# Patient Record
Sex: FEMALE | Race: OTHER
[De-identification: names, ages, dates, MRNs, and addresses within clinical notes are randomized per-mention and may not be internally consistent; named-entity substitution may affect disease eponyms.]

---
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
## 2020-02-04 ENCOUNTER — HOSPITAL ENCOUNTER (INPATIENT)
Dept: HOSPITAL 74 - JER | Age: 20
LOS: 2 days | Discharge: HOME | DRG: 249 | End: 2020-02-06
Attending: NURSE PRACTITIONER | Admitting: INTERNAL MEDICINE
Payer: COMMERCIAL

## 2020-02-04 VITALS — BODY MASS INDEX: 21.6 KG/M2

## 2020-02-04 DIAGNOSIS — N80.9: ICD-10-CM

## 2020-02-04 DIAGNOSIS — K52.9: Primary | ICD-10-CM

## 2020-02-04 DIAGNOSIS — R11.10: ICD-10-CM

## 2020-02-04 DIAGNOSIS — N83.202: ICD-10-CM

## 2020-02-04 DIAGNOSIS — F12.90: ICD-10-CM

## 2020-02-04 DIAGNOSIS — G43.909: ICD-10-CM

## 2020-02-04 DIAGNOSIS — R10.9: ICD-10-CM

## 2020-02-04 DIAGNOSIS — Z29.9: ICD-10-CM

## 2020-02-04 LAB
ALBUMIN SERPL-MCNC: 4.1 G/DL (ref 3.4–5)
ALP SERPL-CCNC: 72 U/L (ref 45–117)
ALT SERPL-CCNC: 22 U/L (ref 13–61)
ANION GAP SERPL CALC-SCNC: 8 MMOL/L (ref 8–16)
APPEARANCE UR: CLEAR
APTT BLD: 24.5 SECONDS (ref 25.2–36.5)
AST SERPL-CCNC: 18 U/L (ref 15–37)
BACTERIA # UR AUTO: 11.2 /HPF
BASOPHILS # BLD: 0.2 % (ref 0–2)
BILIRUB SERPL-MCNC: 0.6 MG/DL (ref 0.2–1)
BILIRUB UR STRIP.AUTO-MCNC: NEGATIVE MG/DL
BUN SERPL-MCNC: 8.2 MG/DL (ref 7–18)
CALCIUM SERPL-MCNC: 9.9 MG/DL (ref 8.5–10.1)
CASTS URNS QL MICRO: 10 /LPF (ref 0–8)
CHLORIDE SERPL-SCNC: 108 MMOL/L (ref 98–107)
CO2 SERPL-SCNC: 23 MMOL/L (ref 21–32)
COLOR UR: YELLOW
CREAT SERPL-MCNC: 0.8 MG/DL (ref 0.55–1.3)
DEPRECATED RDW RBC AUTO: 12.9 % (ref 11.6–15.6)
EOSINOPHIL # BLD: 0.1 % (ref 0–4.5)
EPITH CASTS URNS QL MICRO: 5.5 /HPF
GLUCOSE SERPL-MCNC: 139 MG/DL (ref 74–106)
HCT VFR BLD CALC: 37.3 % (ref 32.4–45.2)
HGB BLD-MCNC: 12.6 GM/DL (ref 10.7–15.3)
INR BLD: 1.13 (ref 0.83–1.09)
KETONES UR QL STRIP: (no result)
LEUKOCYTE ESTERASE UR QL STRIP.AUTO: NEGATIVE
LIPASE SERPL-CCNC: 125 U/L (ref 73–393)
LYMPHOCYTES # BLD: 7.4 % (ref 8–40)
MAGNESIUM SERPL-MCNC: 2 MG/DL (ref 1.8–2.4)
MCH RBC QN AUTO: 28.9 PG (ref 25.7–33.7)
MCHC RBC AUTO-ENTMCNC: 33.7 G/DL (ref 32–36)
MCV RBC: 85.7 FL (ref 80–96)
MONOCYTES # BLD AUTO: 2.9 % (ref 3.8–10.2)
NEUTROPHILS # BLD: 89.4 % (ref 42.8–82.8)
NITRITE UR QL STRIP: NEGATIVE
PH UR: >= 9 [PH] (ref 5–8)
PLATELET # BLD AUTO: 331 K/MM3 (ref 134–434)
PMV BLD: 8.8 FL (ref 7.5–11.1)
POTASSIUM SERPLBLD-SCNC: 3.7 MMOL/L (ref 3.5–5.1)
PROT SERPL-MCNC: 8 G/DL (ref 6.4–8.2)
PROT UR QL STRIP: NEGATIVE
PROT UR QL STRIP: NEGATIVE
PT PNL PPP: 13.4 SEC (ref 9.7–13)
RBC # BLD AUTO: 16 /HPF (ref 0–4)
RBC # BLD AUTO: 4.36 M/MM3 (ref 3.6–5.2)
SODIUM SERPL-SCNC: 139 MMOL/L (ref 136–145)
SP GR UR: 1.02 (ref 1.01–1.03)
UROBILINOGEN UR STRIP-MCNC: 0.2 MG/DL (ref 0.2–1)
WBC # BLD AUTO: 14.5 K/MM3 (ref 4–10)
WBC # UR AUTO: 2 /HPF (ref 0–5)

## 2020-02-04 PROCEDURE — G0008 ADMIN INFLUENZA VIRUS VAC: HCPCS

## 2020-02-04 RX ADMIN — ACETAMINOPHEN PRN MG: 10 INJECTION, SOLUTION INTRAVENOUS at 23:43

## 2020-02-04 RX ADMIN — ACETAMINOPHEN ONE MG: 10 INJECTION, SOLUTION INTRAVENOUS at 14:43

## 2020-02-04 RX ADMIN — SODIUM CHLORIDE, POTASSIUM CHLORIDE, SODIUM LACTATE AND CALCIUM CHLORIDE SCH MLS/HR: 600; 310; 30; 20 INJECTION, SOLUTION INTRAVENOUS at 23:41

## 2020-02-04 RX ADMIN — SODIUM CHLORIDE, POTASSIUM CHLORIDE, SODIUM LACTATE AND CALCIUM CHLORIDE SCH MLS/HR: 600; 310; 30; 20 INJECTION, SOLUTION INTRAVENOUS at 18:20

## 2020-02-04 NOTE — PN
Teaching Attending Note


Name of Resident: Emerson Scott





ATTENDING PHYSICIAN STATEMENT





I saw and evaluated the patient.


I reviewed the resident's note and discussed the case with the resident.


I agree with the resident's findings and plan as documented.








SUBJECTIVE: This is a 19 year old woman with a history of migraine headaches, 

Boerhaave syndrome, marijuana use, endometriosis who comes to the ED 

complaining of sudden onset of nausea, retching, vomiting and diarrhea while 

driving to school this morning. She has chronic intermittent epigastric pain 

for which she takes ibuprofen. She denies fevers but has had chills. She says 

she has been vomiting bilious fluid and diarrhea has been watery and non-

bloody. She denies recent travel. She has not eaten anything or anyplace 

unusual except for some beef on 2/2 (she does not normally eat beef).








OBJECTIVE:


 Vital Signs











 Period  Temp  Pulse  Resp  BP Sys/Hutson  Pulse Ox


 


 Last 24 Hr  98.2 F-98.4 F  62-88  20-20  /  








HEART: S1S2, RRR


LUNGS: Clear


ABDOMEN: Soft, non-distended, (+) periumbilical tenderness, normal BS


EXTREMITIES: No edema





 Laboratory Tests











  02/04/20 02/04/20 02/04/20





  13:30 13:30 13:30


 


WBC   14.5 H 


 


RBC   4.36 


 


Hgb   12.6 


 


Hct   37.3 


 


MCV   85.7 


 


MCH   28.9 


 


MCHC   33.7 


 


RDW   12.9 


 


Plt Count   331  D 


 


MPV   8.8 


 


Absolute Neuts (auto)   13.0 H 


 


Neutrophils %   89.4 H D 


 


Lymphocytes %   7.4 L D 


 


Monocytes %   2.9 L 


 


Eosinophils %   0.1  D 


 


Basophils %   0.2 


 


Nucleated RBC %   0 


 


PT with INR   


 


INR   


 


PTT (Actin FS)   


 


Sodium    139


 


Potassium    3.7


 


Chloride    108 H


 


Carbon Dioxide    23


 


Anion Gap    8


 


BUN    8.2


 


Creatinine    0.8


 


Est GFR (CKD-EPI)AfAm    122.14


 


Est GFR (CKD-EPI)NonAf    105.39


 


Random Glucose    139 H


 


Calcium    9.9


 


Magnesium    2.0


 


Total Bilirubin    0.6


 


AST    18


 


ALT    22


 


Alkaline Phosphatase    72


 


Total Protein    8.0


 


Albumin    4.1


 


Lipase    125


 


Serum Pregnancy, Qual  Negative  


 


Urine Color   


 


Urine Appearance   


 


Urine pH   


 


Ur Specific Gravity   


 


Urine Protein   


 


Urine Glucose (UA)   


 


Urine Ketones   


 


Urine Blood   


 


Urine Nitrite   


 


Urine Bilirubin   


 


Urine Urobilinogen   


 


Ur Leukocyte Esterase   


 


Urine WBC (Auto)   


 


Urine RBC (Auto)   


 


Urine Casts (Auto)   


 


U Epithel Cells (Auto)   


 


Urine Bacteria (Auto)   


 


Blood Type   


 


Antibody Screen   














  02/04/20 02/04/20 02/04/20





  13:30 13:30 17:00


 


WBC   


 


RBC   


 


Hgb   


 


Hct   


 


MCV   


 


MCH   


 


MCHC   


 


RDW   


 


Plt Count   


 


MPV   


 


Absolute Neuts (auto)   


 


Neutrophils %   


 


Lymphocytes %   


 


Monocytes %   


 


Eosinophils %   


 


Basophils %   


 


Nucleated RBC %   


 


PT with INR  13.40 H  


 


INR  1.13 H  


 


PTT (Actin FS)  24.5 L  


 


Sodium   


 


Potassium   


 


Chloride   


 


Carbon Dioxide   


 


Anion Gap   


 


BUN   


 


Creatinine   


 


Est GFR (CKD-EPI)AfAm   


 


Est GFR (CKD-EPI)NonAf   


 


Random Glucose   


 


Calcium   


 


Magnesium   


 


Total Bilirubin   


 


AST   


 


ALT   


 


Alkaline Phosphatase   


 


Total Protein   


 


Albumin   


 


Lipase   


 


Serum Pregnancy, Qual   


 


Urine Color    Yellow


 


Urine Appearance    Clear


 


Urine pH    >= 9.0 H


 


Ur Specific Gravity    1.022


 


Urine Protein    Negative


 


Urine Glucose (UA)    Negative


 


Urine Ketones    2+ H


 


Urine Blood    2+ H


 


Urine Nitrite    Negative


 


Urine Bilirubin    Negative


 


Urine Urobilinogen    0.2


 


Ur Leukocyte Esterase    Negative


 


Urine WBC (Auto)    2


 


Urine RBC (Auto)    16


 


Urine Casts (Auto)    10


 


U Epithel Cells (Auto)    5.5


 


Urine Bacteria (Auto)    11.2


 


Blood Type   O POSITIVE 


 


Antibody Screen   Negative 








 Home Medications











 Medication  Instructions  Recorded


 


Amoxicillin/Potassium Clav 1 each PO BID #10 tablet 04/06/17





[Augmentin 500-125 Tablet]  


 


Clindamycin [Cleocin -] 300 mg PO TID #15 capsule 04/06/17














ASSESSMENT AND PLAN:


This is a 19 year old woman with a history of migraine headaches, Boerhaave 

syndrome, marijuana use, endometriosis who presented to the ED with nausea, 

vomiting and diarrhea.





1. Acute colitis with nausea, vomiting, diarrhea


   - Given Cipro, Flagyl in ED


   - Afebrile, leukocytosis may be reactive


   - Will hold antibiotics


   - Supportive care with IV fluid, pain control, antiemetics


   - Avoid marijuana


   - Check stool culture, WBC, O&P


2. Left ovarian cyst


   - Gyn follow-up as outpatient

## 2020-02-04 NOTE — PDOC
History of Present Illness





- General


Chief Complaint: Nausea/Vomiting


Stated Complaint: COLD SYMPSTOM


Time Seen by Provider: 02/04/20 13:27





- History of Present Illness


Initial Comments: 





Ms. Azevedo is a 20 y/o female with PMH of marijuana use, presenting today with 

epigastric abdominal pain that started this morning. Reports associated nausea 

and vomiting x3 NBNB. No chest pain/shortness of breath. Reports associated 

diffuse abdominal pain. Reports mild frontal headache. Denies dysuria. Denies 

diarrhea. Reports having similar episodes of abdominal pain and nausea/vomiting 

before. No vaginal bleeding or discharge. LNMP 1 week ago. 





Past History





- Past Medical History


Allergies/Adverse Reactions: 


 Allergies











Allergy/AdvReac Type Severity Reaction Status Date / Time


 


No Known Allergies Allergy   Verified 04/02/17 19:48











Home Medications: 


Ambulatory Orders





Amoxicillin/Potassium Clav [Augmentin 500-125 Tablet] 1 each PO BID #10 tablet 

04/06/17 


Clindamycin [Cleocin -] 300 mg PO TID #15 capsule 04/06/17 








Anemia: No


Asthma: No


Cancer: No


Cardiac Disorders: No


CVA: No


COPD: No


CHF: No


Dementia: No


Diabetes: No


GI Disorders: No


 Disorders: No


HTN: No


Hypercholesterolemia: No


Liver Disease: No


Seizures: No


Thyroid Disease: No





- Immunization History


Immunization Up to Date: Yes





- Psycho Social/Smoking Cessation Hx


Smoking History: Unknown if ever smoked


Have you smoked in the past 12 months: No


Number of Cigarettes Smoked Daily: 0


Cigars Per Day: 0


Hx Alcohol Use:  (pt refusing to unswer)


Drug/Substance Use Hx: Yes (refusing to unswer)


Substance Use Type: None


Hx Substance Use Treatment: No





**Review of Systems





- Review of Systems


Comments:: 





GENERAL/CONSTITUTIONAL: No fever or chills. No weakness._


HEAD, EYES, EARS, NOSE AND THROAT: No change in vision. No change in hearing. 

No sore throat._


CARDIOVASCULAR: No chest pain or shortness of breath_


RESPIRATORY: Denies cough, hemoptysis_


GASTROINTESTINAL: Reports abdominal pain, nausea, vomiting. 


GENITOURINARY: No dysuria, frequency, or change in urination._


MUSCULOSKELETAL: No joint or muscle swelling or pain. No neck or back pain._


SKIN: No rash_


NEUROLOGIC: No headache, vertigo, loss of consciousness, or change in strength/

sensation._


ENDOCRINE: No increased thirst. No abnormal weight change_


HEMATOLOGIC/LYMPHATIC: No anemia, easy bleeding, or history of blood clots._


ALLERGIC/IMMUNOLOGIC: No hives or skin allergy._








*Physical Exam





- Vital Signs


 Last Vital Signs











Temp Pulse Resp BP Pulse Ox


 


 98.4 F   74   20   111/75   100 


 


 02/04/20 12:55  02/04/20 13:35  02/04/20 13:35  02/04/20 13:35  02/04/20 13:35














- Physical Exam








GENERAL: Awake, alert, and oriented to person/place/time, in no acute distress_


HEAD: No signs of trauma, normoc ephalic, atraumatic _


EYES: PERRLA, EOMI, sclera anicteric, conjunctiva clear_


ENT: Hearing grossly normal, nares patent, oropharynx clear without exudates. 

No uvular deviation. Moist mucosa_


NECK: Normal ROM, supple, no lymphadenopathy, JVD, or masses_


LUNGS: No distress, speaks in full sentences, clear to auscultation bilaterally 

_


HEART: Regular rate and rhythm, normal S1 and S2, no murmurs appreciated, 

peripheral pulses normal and equal bilaterally._


ABDOMEN: Soft, diffuse TTP worse in the epigastric area, normoactive bowel 

sounds. No guarding, no rebound. No masses_


EXTREMITIES: Normal inspection, Normal range of motion, no edema. No clubbing 

or cyanosis_


NEUROLOGICAL: Cranial nerves II through XII grossly intact. Normal speech, 

normal gait, no focal sensorimotor deficits _


SKIN: Warm, Dry, normal turgor, no rashes or lesions noted_








ED Treatment Course





- LABORATORY


CBC & Chemistry Diagram: 


 02/04/20 13:30





 02/04/20 13:30





- Medications


Given in the ED: 


ED Medications














Discontinued Medications














Generic Name Dose Route Start Last Admin





  Trade Name Freq  PRN Reason Stop Dose Admin


 


Ondansetron HCl  4 mg  02/04/20 13:44  02/04/20 13:50





  Zofran Injection  IVPUSH  02/04/20 13:45  4 mg





  ONCE ONE   Administration





     





     





     





     














Medical Decision Making





- Medical Decision Making





02/04/20 14:41


19F presenting with epigastric abdominal pain, nausea, and vomiting x3.


-cbc, cmp, lipase, coags


-ekg, cxr


-ua, ucx, upreg 


-US gallbladder 





02/04/20 15:25


EKG shows NSR, 69 bpm, no ST elevation, QTc 465. 





02/04/20 15:25


Labs reviewed.


 Laboratory Last Values











WBC  14.5 K/mm3 (4.0-10.0)  H  02/04/20  13:30    


 


RBC  4.36 M/mm3 (3.60-5.2)   02/04/20  13:30    


 


Hgb  12.6 GM/dL (10.7-15.3)   02/04/20  13:30    


 


Hct  37.3 % (32.4-45.2)   02/04/20  13:30    


 


MCV  85.7 fl (80-96)   02/04/20  13:30    


 


MCH  28.9 pg (25.7-33.7)   02/04/20  13:30    


 


MCHC  33.7 g/dl (32.0-36.0)   02/04/20  13:30    


 


RDW  12.9 % (11.6-15.6)   02/04/20  13:30    


 


Plt Count  331 K/MM3 (134-434)  D 02/04/20  13:30    


 


MPV  8.8 fl (7.5-11.1)   02/04/20  13:30    


 


Absolute Neuts (auto)  13.0 K/mm3 (1.5-8.0)  H  02/04/20  13:30    


 


Neutrophils %  89.4 % (42.8-82.8)  H D 02/04/20  13:30    


 


Lymphocytes %  7.4 % (8-40)  L D 02/04/20  13:30    


 


Monocytes %  2.9 % (3.8-10.2)  L  02/04/20  13:30    


 


Eosinophils %  0.1 % (0-4.5)  D 02/04/20  13:30    


 


Basophils %  0.2 % (0-2.0)   02/04/20  13:30    


 


Nucleated RBC %  0 % (0-0)   02/04/20  13:30    


 


PT with INR  13.40 SEC (9.7-13.0)  H  02/04/20  13:30    


 


INR  1.13  (0.83-1.09)  H  02/04/20  13:30    


 


PTT (Actin FS)  24.5 SECONDS (25.2-36.5)  L  02/04/20  13:30    


 


Sodium  139 mmol/L (136-145)   02/04/20  13:30    


 


Potassium  3.7 mmol/L (3.5-5.1)   02/04/20  13:30    


 


Chloride  108 mmol/L ()  H  02/04/20  13:30    


 


Carbon Dioxide  23 mmol/L (21-32)   02/04/20  13:30    


 


Anion Gap  8 MMOL/L (8-16)   02/04/20  13:30    


 


BUN  8.2 mg/dL (7-18)   02/04/20  13:30    


 


Creatinine  0.8 mg/dL (0.55-1.3)   02/04/20  13:30    


 


Est GFR (CKD-EPI)AfAm  122.14   02/04/20  13:30    


 


Est GFR (CKD-EPI)NonAf  105.39   02/04/20  13:30    


 


Random Glucose  139 mg/dL ()  H  02/04/20  13:30    


 


Calcium  9.9 mg/dL (8.5-10.1)   02/04/20  13:30    


 


Magnesium  2.0 mg/dL (1.8-2.4)   02/04/20  13:30    


 


Total Bilirubin  0.6 mg/dL (0.2-1)   02/04/20  13:30    


 


AST  18 U/L (15-37)   02/04/20  13:30    


 


ALT  22 U/L (13-61)   02/04/20  13:30    


 


Alkaline Phosphatase  72 U/L ()   02/04/20  13:30    


 


Total Protein  8.0 g/dl (6.4-8.2)   02/04/20  13:30    


 


Albumin  4.1 g/dl (3.4-5.0)   02/04/20  13:30    


 


Lipase  125 U/L ()   02/04/20  13:30    


 


Serum Pregnancy, Qual  Negative   02/04/20  13:30    














02/04/20 15:34


US shows no acute gallbladder pathology.


2mg haldol IV





02/04/20 16:33


Pt reassessed. Resting comfortably in bed and sleeping. Mother at bedside. 

Abdomen soft and non tender. 





02/04/20 20:12


CT head shows no acute intra cranial pathology.


CXR shows no acute intra thoracic pathology. 


CT abd pelv shows diffuse thickening of colon and signs of colitis.


Will start cipro flagyl. 


UA reviewed. 





 Urine Test Results











Urine Color  Yellow   02/04/20  17:00    


 


Urine Appearance  Clear   02/04/20  17:00    


 


Urine pH  >= 9.0  (5.0-8.0)  H  02/04/20  17:00    


 


Ur Specific Gravity  1.022  (1.010-1.035)   02/04/20  17:00    


 


Urine Protein  Negative  (NEGATIVE)   02/04/20  17:00    


 


Urine Glucose (UA)  Negative  (NEGATIVE)   02/04/20  17:00    


 


Urine Ketones  2+  (NEGATIVE)  H  02/04/20  17:00    


 


Urine Blood  2+  (NEGATIVE)  H  02/04/20  17:00    


 


Urine Nitrite  Negative  (NEGATIVE)   02/04/20  17:00    


 


Urine Bilirubin  Negative  (NEGATIVE)   02/04/20  17:00    


 


Ur Leukocyte Esterase  Negative  (NEGATIVE)   02/04/20  17:00    











02/04/20 21:22


D/w Dr. Scott who accepts the patient for admission. 











Discharge





- Discharge Information


Problems reviewed: Yes


Clinical Impression/Diagnosis: 


 Colitis, Intractable vomiting





Condition: Stable





- Admission


Yes





- Follow up/Referral





- Patient Discharge Instructions





- Post Discharge Activity

## 2020-02-04 NOTE — PDOC
Documentation entered by Austin Palacios SCRIBE, acting as scribe for Smita Man MD.








Smita Man MD:  This documentation has been prepared by the Philip montero Daniel, SCRIBE, under my direction and personally reviewed by me in its 

entirety.  I confirm that the documentation accurately reflects all work, 

treatment, procedures, and medical decision making performed by me.  





Attending Attestation





- Resident


Resident Name: Artemio Bernard





- ED Attending Attestation


I have performed the following: I have examined & evaluated the patient, The 

case was reviewed & discussed with the resident, I agree w/resident's findings 

& plan, Exceptions are as noted





- HPI


HPI: 





02/04/20 14:20


The patient is a 19 year old female with a past medical history of anxiety here 

today for evaluation of vomiting and abdominal pain. The patient reports that 

she has had diffuse abdominal pain that is most prominent in the epigastric and 

RUQ areas and NBNB vomiting since this morning. She states that she has had 3 

episodes of vomiting. Pt is poorly cooperative with history due to nausea. Pt 

also reports gradual onset global headache since this morning as well. No 

treatments tried. Pt smokes marijuana multiple times per week, last 2 days ago, 


 


Patient denies dizziness, focal weakness/numbness. Denies fever, chills. Denies 

chest pain, shortness of breath. Denies diarrhea. 





Allergies: NKA








- Physicial Exam


PE: 





02/04/20 14:20


GENERAL: Awake, alert, and fully oriented, in no acute distress but appears 

uncomfortable


EYES: PERRLA, EOMI, sclera anicteric, conjunctiva clear


ENT: Oropharynx clear without exudates. Moist mucosa


NECK: Normal ROM, supple, no lymphadenopathy, JVD, or masses


LUNGS: Breath sounds equal, clear to auscultation bilaterally. No wheezes, and 

no crackles


HEART: Regular rate and rhythm, normal S1 and S2, no murmurs, rubs or gallops


ABDOMEN: Soft, non distended, diffuse non focal ttp w/o guarding, no rebound. 

No masses


EXTREMITIES: Normal range of motion, no edema. No cords, erythema, or tenderness


NEUROLOGICAL: Normal speech, cranial nerves intact, equal strength and 

sensation b/l 


SKIN: Warm, Dry, normal turgor, no rashes or lesions noted.








- Medical Decision Making





02/04/20 18:00


20yo F presents to the ED with diffuse abd pain, headache, N/V


Vitals unremarkable (rpt /74)


Exam with diffuse mild abd ttp, pt wretching


DDx cannabinoid hypermesis vs cholecystitis vs pancreatitis vs gastritis vs 

gastroenteritis vs appendicitis


Bedside US by Dr. Nazario negative for GB pathology


Labs with leukocytosis to 15, UA with ketones


Poor response to zofran, thus obtained EKG for QTC and put pt on cardiac  

monitor


2mg haldol IV given as well as 25mg benadryl with good response


On rpt evaluation and PO challenge, however, pt vomiting water


REmains diffusely tender, most focal in RUQ


Plan for IV ativan for nausea


Will also obtain CTH given headache and vomiting as well as CTAP to r/o acute 

intrabd pathology





02/04/20 19:00


Case signed out to night team for f/u on CTH/CTAP, reassessment, dispo








**Heart Score/ECG Review





- ECG Intrepretation


Comment:: 





02/04/20 18:44


Twelve-lead EKG was performed and reviewed by me.  Normal sinus rhythm, rate 

69.  Normal axis.  No ST elevations, no prolonged QT

## 2020-02-05 LAB
ANION GAP SERPL CALC-SCNC: 7 MMOL/L (ref 8–16)
BASOPHILS # BLD: 0.2 % (ref 0–2)
BUN SERPL-MCNC: 5.6 MG/DL (ref 7–18)
CALCIUM SERPL-MCNC: 8.6 MG/DL (ref 8.5–10.1)
CHLORIDE SERPL-SCNC: 109 MMOL/L (ref 98–107)
CO2 SERPL-SCNC: 25 MMOL/L (ref 21–32)
CREAT SERPL-MCNC: 0.6 MG/DL (ref 0.55–1.3)
DEPRECATED RDW RBC AUTO: 12.7 % (ref 11.6–15.6)
EOSINOPHIL # BLD: 0.3 % (ref 0–4.5)
GLUCOSE SERPL-MCNC: 86 MG/DL (ref 74–106)
HCT VFR BLD CALC: 31.5 % (ref 32.4–45.2)
HGB BLD-MCNC: 10.9 GM/DL (ref 10.7–15.3)
LYMPHOCYTES # BLD: 23.5 % (ref 8–40)
MAGNESIUM SERPL-MCNC: 1.9 MG/DL (ref 1.8–2.4)
MCH RBC QN AUTO: 29.6 PG (ref 25.7–33.7)
MCHC RBC AUTO-ENTMCNC: 34.6 G/DL (ref 32–36)
MCV RBC: 85.6 FL (ref 80–96)
MONOCYTES # BLD AUTO: 6.3 % (ref 3.8–10.2)
NEUTROPHILS # BLD: 69.7 % (ref 42.8–82.8)
PLATELET # BLD AUTO: 238 K/MM3 (ref 134–434)
PMV BLD: 8.6 FL (ref 7.5–11.1)
POTASSIUM SERPLBLD-SCNC: 3.6 MMOL/L (ref 3.5–5.1)
RBC # BLD AUTO: 3.68 M/MM3 (ref 3.6–5.2)
SODIUM SERPL-SCNC: 141 MMOL/L (ref 136–145)
WBC # BLD AUTO: 8.9 K/MM3 (ref 4–10)

## 2020-02-05 RX ADMIN — ONDANSETRON SCH MG: 2 INJECTION INTRAMUSCULAR; INTRAVENOUS at 22:03

## 2020-02-05 RX ADMIN — SODIUM CHLORIDE, POTASSIUM CHLORIDE, SODIUM LACTATE AND CALCIUM CHLORIDE SCH MLS/HR: 600; 310; 30; 20 INJECTION, SOLUTION INTRAVENOUS at 20:11

## 2020-02-05 RX ADMIN — ACETAMINOPHEN PRN MG: 10 INJECTION, SOLUTION INTRAVENOUS at 20:09

## 2020-02-05 RX ADMIN — ENOXAPARIN SODIUM SCH: 40 INJECTION SUBCUTANEOUS at 10:29

## 2020-02-05 RX ADMIN — ONDANSETRON SCH MG: 2 INJECTION INTRAMUSCULAR; INTRAVENOUS at 20:05

## 2020-02-05 RX ADMIN — ACETAMINOPHEN PRN MG: 10 INJECTION, SOLUTION INTRAVENOUS at 09:29

## 2020-02-05 RX ADMIN — PANTOPRAZOLE SODIUM SCH MG: 40 INJECTION, POWDER, FOR SOLUTION INTRAVENOUS at 22:03

## 2020-02-05 RX ADMIN — METOCLOPRAMIDE SCH MG: 5 INJECTION, SOLUTION INTRAMUSCULAR; INTRAVENOUS at 20:05

## 2020-02-05 RX ADMIN — CEFTRIAXONE SCH MLS/HR: 1 INJECTION, POWDER, FOR SOLUTION INTRAMUSCULAR; INTRAVENOUS at 10:29

## 2020-02-05 NOTE — EKG
Test Reason : 

Blood Pressure : ***/*** mmHG

Vent. Rate : 069 BPM     Atrial Rate : 069 BPM

   P-R Int : 156 ms          QRS Dur : 082 ms

    QT Int : 434 ms       P-R-T Axes : 059 085 060 degrees

   QTc Int : 465 ms

 

NORMAL SINUS RHYTHM WITH SINUS ARRHYTHMIA

NORMAL ECG

WHEN COMPARED WITH ECG OF 04-APR-2017 07:13,

NO SIGNIFICANT CHANGE WAS FOUND

Confirmed by Artemio Yuen MD ([id number]) on 2/5/2020 11:38:56 AM

 

Referred By:             Confirmed By:Artemio Yuen MD

## 2020-02-05 NOTE — CON.GI
Consult


Consult Specialty:: GI


Referred by:: Dr. Helga Scott


Reason for Consultation:: Colitis, Nausea/Vomiting





- History of Present Illness


History of Present Illness: 





Patient is a 20 y/o female with past medical history of Migraines, Anxiety, 

Pneumomediastinum from vomiting, and chronic marijuana use.  She states that 

yesterday morning at 8am she developed sharp, non-radiating lower abdominal 

pain then began vomiting blood streaked contents and one episode of non-bloody 

diarrhea.  She states that vomiting lasted throughout day, was green in color, 

and later developed epigastric pain.  She denies further episodes of diarrhea 

and states lower abdominal pain is slightly improved. She states having 

dysphagia, poor appetite.  Denies rectal bleeding or melena.  CTAP shows 

diffusely thickened colon suspicious for colitis, left ovarian cyst.  No fevers 

reported, initially at leukocytosis with WBC 14.5 but shows downtrend and 

currently 8.9.





- History Source


History Provided By: Patient


Limitations to Obtaining History: No Limitations





- Past Medical History


CNS: Yes: Migraine


...LMP: 01/30/20


...Pregnant: No


Psych: Yes: Anxiety





- Alcohol/Substance Use


Hx Alcohol Use: No





- Smoking History


Smoking history: Current every day smoker


Have you smoked in the past 12 months: Yes


Aproximately how many cigarettes per day: 0





- Social History


ADL: Independent


History of Recent Travel: No





Home Medications





- Allergies


Allergies/Adverse Reactions: 


 Allergies











Allergy/AdvReac Type Severity Reaction Status Date / Time


 


No Known Allergies Allergy   Verified 04/02/17 19:48














- Home Medications


Home Medications: 


Ambulatory Orders





Amoxicillin/Potassium Clav [Augmentin 500-125 Tablet] 1 each PO BID #10 tablet 

04/06/17 


Clindamycin [Cleocin -] 300 mg PO TID #15 capsule 04/06/17 











Review of Systems





- Review of Systems


Constitutional: reports: Loss of Appetite, Weakness


Eyes: reports: No Symptoms


HENT: reports: Difficult Swallowing


Neck: reports: No Symptoms


Cardiovascular: reports: No Symptoms


Respiratory: reports: No Symptoms


Gastrointestinal: reports: Abdominal Pain, Dysphagia, Nausea, Vomiting


Genitourinary: reports: No Symptoms


Breasts: reports: No Symptoms Reported


Musculoskeletal: reports: No Symptoms


Integumentary: reports: No Symptoms


Neurological: reports: No Symptoms


Endocrine: reports: No Symptoms


Hematology/Lymphatic: reports: No Symptoms


Psychiatric: reports: No Symptoms





Physical Exam-GI


Vital Signs: 


 Vital Signs











Temperature  98.5 F   02/05/20 06:00


 


Pulse Rate  75   02/05/20 06:00


 


Respiratory Rate  18   02/05/20 06:00


 


Blood Pressure  114/45 L  02/05/20 06:00


 


O2 Sat by Pulse Oximetry (%)  99   02/04/20 23:48











Constitutional: Yes: No Distress, Calm, Thin


Eyes: Yes: Conjunctiva Clear


HENT: Yes: Atraumatic


Cardiovascular: Yes: Regular Rate and Rhythm


Respiratory: Yes: Regular, CTA Bilaterally


Gastrointestinal Inspection: Yes: WNL.  No: Ascites, Distention, Hernia, Scars, 

Other


...Auscultate: Yes: Normoactive Bowel Sounds.  No: Hyperactive Bowel Sounds, 

Hypoactive Bowel Sounds, No Bowel Sounds, Other


...Palpate: Yes: Soft, Tenderness (llq).  No: Firm/Rigid, Guarding, Hepatomegaly

, Mass, Pulsatile Mass, Splenomegaly, Tenderness, Epigastium, Tenderness, 

Rebound, Other


...Percussion: Yes: Tympanitic.  No: Dullness, Fluid Wave, Other


Labs: 


 CBC, BMP





 02/05/20 06:55 





 02/05/20 06:55 





 INR, PTT











INR  1.13  (0.83-1.09)  H  02/04/20  13:30    














Imaging





- Results


Cat Scan: Report Reviewed





Problem List





- Problems


(1) Colitis


Assessment/Plan: 


>R/O infectious vs ischemic colitis


  Ceftriaxone and Flagyl


  Zofran for nausea/vomiting


  Stool Culture, Ova and Parastie


  clear liquid diet


Code(s): K52.9 - NONINFECTIVE GASTROENTERITIS AND COLITIS, UNSPECIFIED   





(2) Intractable vomiting


Assessment/Plan: 


r/o secodnary to PUD, gastritis


 R> IV Pantoprazole


    IV Reglan


Code(s): R11.10 - VOMITING, UNSPECIFIED

## 2020-02-05 NOTE — PN
Physical Exam: 


SUBJECTIVE: Patient seen and examined at the bedside.  denies any nausea or 

vomiting.





OBJECTIVE:


Patient is a 19 year old female with a past medical history of migraines, 

anxiety, pneumomediastinum from vomiting, and chronic marijuana use. She 

presents for a 1 day history of epigastric pain, 3-4 episodes of nausea and 

green vomiting and 1-2 episodes of diarrhea without blood.   An abdomen/pelvis 

CT shows diffusely thickened colon suspicious for colitis.  A left ovarian cyst 

also noted.





 Vital Signs











 Period  Temp  Pulse  Resp  BP Sys/Hutson  Pulse Ox


 


 Last 24 Hr  98.0 F-98.9 F  62-91  17-20  /  








GENERAL: The patient is awake, alert, and fully oriented, in no acute distress.


HEAD: Normal with no signs of trauma.


EYES: PERRL, extraocular movements intact, sclera anicteric, conjunctiva clear. 

No ptosis. 


ENT: Ears normal, nares patent 


NECK: Trachea midline, full range of motion, supple. 


LUNGS: Breath sounds equal, clear to auscultation bilaterally 


HEART: Regular rate and rhythm 


ABDOMEN: Soft, nontender, nondistended, normoactive bowel sounds 


EXTREMITIES: 2+ pulses, warm, well-perfused, no edema. 


NEUROLOGICAL: awake and alert


PSYCH: Normal mood, normal affect.


SKIN: Warm, dry, normal turgor, no rashes or lesions noted





 Laboratory Results - last 24 hr











  02/04/20 02/04/20 02/04/20





  13:30 13:30 13:30


 


WBC   14.5 H 


 


RBC   4.36 


 


Hgb   12.6 


 


Hct   37.3 


 


MCV   85.7 


 


MCH   28.9 


 


MCHC   33.7 


 


RDW   12.9 


 


Plt Count   331  D 


 


MPV   8.8 


 


Absolute Neuts (auto)   13.0 H 


 


Neutrophils %   89.4 H D 


 


Lymphocytes %   7.4 L D 


 


Monocytes %   2.9 L 


 


Eosinophils %   0.1  D 


 


Basophils %   0.2 


 


Nucleated RBC %   0 


 


PT with INR   


 


INR   


 


PTT (Actin FS)   


 


Sodium    139


 


Potassium    3.7


 


Chloride    108 H


 


Carbon Dioxide    23


 


Anion Gap    8


 


BUN    8.2


 


Creatinine    0.8


 


Est GFR (CKD-EPI)AfAm    122.14


 


Est GFR (CKD-EPI)NonAf    105.39


 


Random Glucose    139 H


 


Calcium    9.9


 


Magnesium    2.0


 


Total Bilirubin    0.6


 


AST    18


 


ALT    22


 


Alkaline Phosphatase    72


 


Total Protein    8.0


 


Albumin    4.1


 


Lipase    125


 


TSH   


 


Serum Pregnancy, Qual  Negative  


 


Urine Color   


 


Urine Appearance   


 


Urine pH   


 


Ur Specific Gravity   


 


Urine Protein   


 


Urine Glucose (UA)   


 


Urine Ketones   


 


Urine Blood   


 


Urine Nitrite   


 


Urine Bilirubin   


 


Urine Urobilinogen   


 


Ur Leukocyte Esterase   


 


Urine WBC (Auto)   


 


Urine RBC (Auto)   


 


Urine Casts (Auto)   


 


U Epithel Cells (Auto)   


 


Urine Bacteria (Auto)   


 


Blood Type   


 


Antibody Screen   














  02/04/20 02/04/20 02/04/20





  13:30 13:30 17:00


 


WBC   


 


RBC   


 


Hgb   


 


Hct   


 


MCV   


 


MCH   


 


MCHC   


 


RDW   


 


Plt Count   


 


MPV   


 


Absolute Neuts (auto)   


 


Neutrophils %   


 


Lymphocytes %   


 


Monocytes %   


 


Eosinophils %   


 


Basophils %   


 


Nucleated RBC %   


 


PT with INR  13.40 H  


 


INR  1.13 H  


 


PTT (Actin FS)  24.5 L  


 


Sodium   


 


Potassium   


 


Chloride   


 


Carbon Dioxide   


 


Anion Gap   


 


BUN   


 


Creatinine   


 


Est GFR (CKD-EPI)AfAm   


 


Est GFR (CKD-EPI)NonAf   


 


Random Glucose   


 


Calcium   


 


Magnesium   


 


Total Bilirubin   


 


AST   


 


ALT   


 


Alkaline Phosphatase   


 


Total Protein   


 


Albumin   


 


Lipase   


 


TSH   


 


Serum Pregnancy, Qual   


 


Urine Color    Yellow


 


Urine Appearance    Clear


 


Urine pH    >= 9.0 H


 


Ur Specific Gravity    1.022


 


Urine Protein    Negative


 


Urine Glucose (UA)    Negative


 


Urine Ketones    2+ H


 


Urine Blood    2+ H


 


Urine Nitrite    Negative


 


Urine Bilirubin    Negative


 


Urine Urobilinogen    0.2


 


Ur Leukocyte Esterase    Negative


 


Urine WBC (Auto)    2


 


Urine RBC (Auto)    16


 


Urine Casts (Auto)    10


 


U Epithel Cells (Auto)    5.5


 


Urine Bacteria (Auto)    11.2


 


Blood Type   O POSITIVE 


 


Antibody Screen   Negative 














  02/05/20 02/05/20





  06:55 06:55


 


WBC  8.9 


 


RBC  3.68 


 


Hgb  10.9 


 


Hct  31.5 L D 


 


MCV  85.6 


 


MCH  29.6 


 


MCHC  34.6 


 


RDW  12.7 


 


Plt Count  238  D 


 


MPV  8.6 


 


Absolute Neuts (auto)  6.2 


 


Neutrophils %  69.7  D 


 


Lymphocytes %  23.5  D 


 


Monocytes %  6.3  D 


 


Eosinophils %  0.3  D 


 


Basophils %  0.2 


 


Nucleated RBC %  0 


 


PT with INR  


 


INR  


 


PTT (Actin FS)  


 


Sodium   141


 


Potassium   3.6


 


Chloride   109 H


 


Carbon Dioxide   25


 


Anion Gap   7 L


 


BUN   5.6 L


 


Creatinine   0.6


 


Est GFR (CKD-EPI)AfAm   151.01


 


Est GFR (CKD-EPI)NonAf   130.29


 


Random Glucose   86


 


Calcium   8.6


 


Magnesium   1.9


 


Total Bilirubin  


 


AST  


 


ALT  


 


Alkaline Phosphatase  


 


Total Protein  


 


Albumin  


 


Lipase  


 


TSH   0.34 L


 


Serum Pregnancy, Qual  


 


Urine Color  


 


Urine Appearance  


 


Urine pH  


 


Ur Specific Gravity  


 


Urine Protein  


 


Urine Glucose (UA)  


 


Urine Ketones  


 


Urine Blood  


 


Urine Nitrite  


 


Urine Bilirubin  


 


Urine Urobilinogen  


 


Ur Leukocyte Esterase  


 


Urine WBC (Auto)  


 


Urine RBC (Auto)  


 


Urine Casts (Auto)  


 


U Epithel Cells (Auto)  


 


Urine Bacteria (Auto)  


 


Blood Type  


 


Antibody Screen  








Active Medications











Generic Name Dose Route Start Last Admin





  Trade Name Freq  PRN Reason Stop Dose Admin


 


Acetaminophen  1,000 mg  02/04/20 14:34  02/04/20 14:43





  Ofirmev Injection -  IVPB   1,000 mg





  ONCE ONE   Administration





     





     





     





     


 


Acetaminophen  1,000 mg  02/04/20 22:21  02/05/20 09:29





  Ofirmev Injection -  IVPB  02/05/20 22:21  1,000 mg





  Q6H PRN   Administration





  PAIN LEVEL 6-10   





     





     





     


 


Enoxaparin Sodium  40 mg  02/05/20 10:00  02/05/20 10:29





  Lovenox -  SQ   Not Given





  DAILY SENIA   





     





     





     





     


 


Lactated Ringer's  1,000 ml in 1,000 mls @ 100 mls/hr  02/04/20 18:15  02/04/20 

23:41





  Lactated Ringers Solution  IV   100 mls/hr





  ASDIR SENIA   Administration





     





     





     





     


 


Ceftriaxone Sodium 1 gm/  50 mls @ 200 mls/hr  02/05/20 10:00  02/05/20 10:29





  Dextrose  IVPB   200 mls/hr





  DAILY SENIA   Administration





     





     





  Protocol   





     


 


Metronidazole  250 mg in 50 mls @ 50 mls/hr  02/05/20 10:00  





  Flagyl 250mg Premixed Ivpb -  IVPB   





  Q8H-IV SENIA   





     





     





     





     


 


Ondansetron HCl  4 mg  02/04/20 22:19  





  Zofran Injection  IVPUSH   





  Q6H PRN   





  NAUSEA   





     





     





     











ASSESSMENT/PLAN:








Problem List





- Problems


(1) Colitis


Assessment/Plan: 


ct scan as noted above.  


on flagyl and ceftriaxone per GI


stool studies ordered/pending


manage pain and nausea with tylenol/zofran, but currently asymptomatic


Code(s): K52.9 - NONINFECTIVE GASTROENTERITIS AND COLITIS, UNSPECIFIED   





(2) Abdominal pain


Assessment/Plan: 


now resolved and tolerating clears


advance diet per GI


Code(s): R10.9 - UNSPECIFIED ABDOMINAL PAIN   





(3) Prophylactic measure


Assessment/Plan: 


fen


lovenox


continue ivf


monitor electrolytes


full code





Code(s): Z29.9 - ENCOUNTER FOR PROPHYLACTIC MEASURES, UNSPECIFIED   





Visit type





- Emergency Visit


Emergency Visit: Yes


ED Registration Date: 02/04/20


Care time: The patient presented to the Emergency Department on the above date 

and was hospitalized for further evaluation of their emergent condition.





- New Patient


This patient is new to me today: Yes


Date on this admission: 02/05/20





- Critical Care


Critical Care patient: No





- Discharge Referral


Referred to [facility] No

## 2020-02-05 NOTE — HP
CHIEF COMPLAINT: nausea, vomiting, epigastric pain





PCP: none





HISTORY OF PRESENT ILLNESS:


Brenda Azevedo is a 19 year old female with a past medical history of migraines

, anxiety, pneumomediastinum from vomiting, and chronic marijuana use. She 

presents for a 1 day history of epigastric pain, 3-4 episodes of nausea and 

green vomiting. Denies blood in the vomit. Also notes that she had 1-2 episodes 

of diarrhea without blood. She endorses diffuse abdominal pain that is 

prominent in the epigastric region. She notes that she had beef yesterday that 

she normally does not eat. She denies other sick contacts, has a pet cat that 

is not sick and no other animal contacts, no recent travel. Endorses that she 

smokes marijuana regularly, every other day. Also endorsed a mild frontal 

headache. Stated that her last menstrual period was 1 week prior but feels that 

she had some bleeding today after she took some Motrin to assist with the 

abdominal pain. Otherwise denies acute complaints of cp, sob, dizziness, 

lightheadedness, difficulty swallowing, syncope, back pain, numbness, tingling, 

extremity pain, rashes, lesions on the skin. 





ER course was notable for:


(1) WBC 14.5 with left shift, UA with pH>9.0, 2+ ketones, 2+ blood


(2) bedside U/s with no acute pathology


(3) CT/abd pelvis noting colitis with diffusely thickened colon, L ovarian cyst 

and free pelvic fluid


(4) Head CT with no acute pathology





Recent Travel: denies





PAST MEDICAL HISTORY: as above





PAST SURGICAL HISTORY: denies





Social History:


Smoking: denies


Alcohol: denies


Drugs: endorses regular every other day marijuana use





Allergies





No Known Allergies Allergy (Verified 04/02/17 19:48)


 








HOME MEDICATIONS:


 Home Medications











 Medication  Instructions  Recorded


 


Amoxicillin/Potassium Clav 1 each PO BID #10 tablet 04/06/17





[Augmentin 500-125 Tablet]  


 


Clindamycin [Cleocin -] 300 mg PO TID #15 capsule 04/06/17








REVIEW OF SYSTEMS


CONSTITUTIONAL:  generalized weakness, loss of appetite


Absent:  fever, chills, diaphoresis, malaise,  weight change


HEENT: 


Absent:  rhinorrhea, nasal congestion, throat pain, throat swelling, difficulty 

swallowing, visual changes


CARDIOVASCULAR: 


Absent: chest pain, syncope, palpitations, irregular heart rate, lightheadedness

, peripheral edema


RESPIRATORY: 


Absent: cough, shortness of breath, dyspnea with exertion, orthopnea, wheezing, 


GASTROINTESTINAL: nausea, vomiting, diarrhea


Absent: abdominal pain, abdominal distension, constipation, melena, hematochezia


GENITOURINARY: small amount of vaginal bleeding


Absent: dysuria, frequency, urgency, hesitancy, hematuria, flank pain, genital 

pain


MUSCULOSKELETAL: 


Absent: myalgia, arthralgia, joint swelling, back pain, neck pain


SKIN: 


Absent: rash, itching, pallor


HEMATOLOGIC/IMMUNOLOGIC: 


Absent: easy bleeding, easy bruising, lymphadenopathy, frequent infections


ENDOCRINE:


Absent: unexplained weight gain, unexplained weight loss, heat intolerance, 

cold intolerance


NEUROLOGIC: 


Absent: headache, focal weakness or paresthesias, dizziness, unsteady gait, 

seizure, mental status changes, bladder or bowel incontinence


PSYCHIATRIC: 


Absent: anxiety, depression, suicidal or homicidal ideation, hallucinations.








PHYSICAL EXAMINATION


 Vital Signs - 24 hr











  02/04/20 02/04/20 02/04/20





  12:55 13:25 13:35


 


Temperature 98.4 F  


 


Pulse Rate 62  


 


Pulse Rate [  70 74





Left Radial]   


 


Respiratory 20 20 20





Rate   


 


Blood Pressure 125/112 H  


 


Blood Pressure  82/60 L 111/75





[Left Arm]   


 


O2 Sat by Pulse 99 100 100





Oximetry (%)   














  02/04/20 02/04/20 02/04/20





  13:45 16:30 17:22


 


Temperature  98.2 F 


 


Pulse Rate 74 84 76


 


Pulse Rate [  84 





Left Radial]   


 


Respiratory 20 20 20





Rate   


 


Blood Pressure 111/75 110/70 121/82


 


Blood Pressure  110/70 





[Left Arm]   


 


O2 Sat by Pulse  98 





Oximetry (%)   














  02/04/20 02/04/20 02/04/20





  18:30 23:00 23:48


 


Temperature   98.0 F


 


Pulse Rate 88  91 H


 


Pulse Rate [  71 





Left Radial]   


 


Respiratory 20 18 18





Rate   


 


Blood Pressure 116/80  104/50 L


 


Blood Pressure  109/56 L 





[Left Arm]   


 


O2 Sat by Pulse  99 99





Oximetry (%)   











GENERAL: Awake, alert, and fully oriented, in moderate acute distress.


HEAD: Normal with no signs of trauma.


EYES: Pupils equal, round and reactive to light, extraocular movements intact, 

sclera anicteric, conjunctiva clear.


EARS, NOSE, THROAT: Oropharynx clear without exudates. Dry mucous membranes.


NECK: Normal range of motion, supple without lymphadenopathy, JVD.


LUNGS: Breath sounds equal, clear to auscultation bilaterally. No wheezes, and 

no crackles. No accessory muscle use.


HEART: Regular rate and rhythm, normal S1 and S2 without murmur, rub.


ABDOMEN: Soft, tender to palpation throughout most prominently in the 

epigastric region, not distended, normoactive bowel sounds, no guarding, no 

rebound, no masses. No Mendez's sign


MUSCULOSKELETAL: Normal range of motion at all joints. No bony deformities or 

tenderness. Minor CVA tenderness bilaterally. 


UPPER EXTREMITIES: 2+ pulses, warm, well-perfused. No cyanosis. No clubbing. No 

peripheral edema.


LOWER EXTREMITIES: 2+ pulses, warm, well-perfused. No calf tenderness. No 

peripheral edema. 


NEUROLOGICAL:  Cranial nerves II-XII intact. 5/5 muscle strength upper and 

lower extremities bilaterally. 


PSYCHIATRIC: Cooperative. Good eye contact. Appropriate mood and affect.


SKIN: Warm, dry, normal turgor, no rashes or lesions noted, normal capillary 

refill. 





 Laboratory Results - last 24 hr











  02/04/20 02/04/20 02/04/20





  13:30 13:30 13:30


 


WBC   14.5 H 


 


RBC   4.36 


 


Hgb   12.6 


 


Hct   37.3 


 


MCV   85.7 


 


MCH   28.9 


 


MCHC   33.7 


 


RDW   12.9 


 


Plt Count   331  D 


 


MPV   8.8 


 


Absolute Neuts (auto)   13.0 H 


 


Neutrophils %   89.4 H D 


 


Lymphocytes %   7.4 L D 


 


Monocytes %   2.9 L 


 


Eosinophils %   0.1  D 


 


Basophils %   0.2 


 


Nucleated RBC %   0 


 


PT with INR   


 


INR   


 


PTT (Actin FS)   


 


Sodium    139


 


Potassium    3.7


 


Chloride    108 H


 


Carbon Dioxide    23


 


Anion Gap    8


 


BUN    8.2


 


Creatinine    0.8


 


Est GFR (CKD-EPI)AfAm    122.14


 


Est GFR (CKD-EPI)NonAf    105.39


 


Random Glucose    139 H


 


Calcium    9.9


 


Magnesium    2.0


 


Total Bilirubin    0.6


 


AST    18


 


ALT    22


 


Alkaline Phosphatase    72


 


Total Protein    8.0


 


Albumin    4.1


 


Lipase    125


 


Serum Pregnancy, Qual  Negative  


 


Urine Color   


 


Urine Appearance   


 


Urine pH   


 


Ur Specific Gravity   


 


Urine Protein   


 


Urine Glucose (UA)   


 


Urine Ketones   


 


Urine Blood   


 


Urine Nitrite   


 


Urine Bilirubin   


 


Urine Urobilinogen   


 


Ur Leukocyte Esterase   


 


Urine WBC (Auto)   


 


Urine RBC (Auto)   


 


Urine Casts (Auto)   


 


U Epithel Cells (Auto)   


 


Urine Bacteria (Auto)   


 


Blood Type   


 


Antibody Screen   














  02/04/20 02/04/20 02/04/20





  13:30 13:30 17:00


 


WBC   


 


RBC   


 


Hgb   


 


Hct   


 


MCV   


 


MCH   


 


MCHC   


 


RDW   


 


Plt Count   


 


MPV   


 


Absolute Neuts (auto)   


 


Neutrophils %   


 


Lymphocytes %   


 


Monocytes %   


 


Eosinophils %   


 


Basophils %   


 


Nucleated RBC %   


 


PT with INR  13.40 H  


 


INR  1.13 H  


 


PTT (Actin FS)  24.5 L  


 


Sodium   


 


Potassium   


 


Chloride   


 


Carbon Dioxide   


 


Anion Gap   


 


BUN   


 


Creatinine   


 


Est GFR (CKD-EPI)AfAm   


 


Est GFR (CKD-EPI)NonAf   


 


Random Glucose   


 


Calcium   


 


Magnesium   


 


Total Bilirubin   


 


AST   


 


ALT   


 


Alkaline Phosphatase   


 


Total Protein   


 


Albumin   


 


Lipase   


 


Serum Pregnancy, Qual   


 


Urine Color    Yellow


 


Urine Appearance    Clear


 


Urine pH    >= 9.0 H


 


Ur Specific Gravity    1.022


 


Urine Protein    Negative


 


Urine Glucose (UA)    Negative


 


Urine Ketones    2+ H


 


Urine Blood    2+ H


 


Urine Nitrite    Negative


 


Urine Bilirubin    Negative


 


Urine Urobilinogen    0.2


 


Ur Leukocyte Esterase    Negative


 


Urine WBC (Auto)    2


 


Urine RBC (Auto)    16


 


Urine Casts (Auto)    10


 


U Epithel Cells (Auto)    5.5


 


Urine Bacteria (Auto)    11.2


 


Blood Type   O POSITIVE 


 


Antibody Screen   Negative 











ASSESSMENT/PLAN:


Brenda Azevedo is a 19 year old female with a past medical history of migraines

, anxiety, pneumomediastinum from vomiting, and chronic marijuana use admitted 

for colitis and inability to tolerate PO intake.





Colitis


- CT scan as above


- unclear reason for development of colitis, no family history of GI issues, 

has history of marijuana use, questionable cannabinoid hyperemesis syndrome


- given Cipro/Flagyl in ED, no further antibiotics as patient without fevers


- continue fluid hydration with LR


- stool studies ordered


- GI for possible colonoscopy, has been seen by Dr. Perry in the past


- Tylenol for pain


- Zofran for nausea


- advised patient to cease marijuana use





L ovarian cyst


- will need outpatient GYN f/u





Blood in UA


- likely contaminant from vaginal bleeding





DVT PPx


- Lovenox 40mg subq daily





FEN


- LR at 100cc/hr


- continue to monitor electrolytes and replete as necessary


- NPO and advance diet as per GI





Dispo


- admit to Med-surg





Family Medical History


Family Hx Diabetes: Grandmother (maternal)


Other Family History: denies family history of UC, Chrohns, other colon or GI 

pathology





Visit type





- Emergency Visit


Emergency Visit: Yes


ED Registration Date: 02/04/20


Care time: The patient presented to the Emergency Department on the above date 

and was hospitalized for further evaluation of their emergent condition.





- New Patient


This patient is new to me today: Yes


Date on this admission: 02/05/20





- Critical Care


Critical Care patient: No

## 2020-02-06 VITALS — DIASTOLIC BLOOD PRESSURE: 73 MMHG | SYSTOLIC BLOOD PRESSURE: 121 MMHG | TEMPERATURE: 98.8 F | HEART RATE: 66 BPM

## 2020-02-06 LAB
ALBUMIN SERPL-MCNC: 3.6 G/DL (ref 3.4–5)
ALP SERPL-CCNC: 60 U/L (ref 45–117)
ALT SERPL-CCNC: 17 U/L (ref 13–61)
ANION GAP SERPL CALC-SCNC: 10 MMOL/L (ref 8–16)
AST SERPL-CCNC: 13 U/L (ref 15–37)
BASOPHILS # BLD: 0.4 % (ref 0–2)
BILIRUB SERPL-MCNC: 0.8 MG/DL (ref 0.2–1)
BUN SERPL-MCNC: 6.2 MG/DL (ref 7–18)
CALCIUM SERPL-MCNC: 8.4 MG/DL (ref 8.5–10.1)
CHLORIDE SERPL-SCNC: 107 MMOL/L (ref 98–107)
CO2 SERPL-SCNC: 23 MMOL/L (ref 21–32)
CREAT SERPL-MCNC: 0.8 MG/DL (ref 0.55–1.3)
DEPRECATED RDW RBC AUTO: 12.4 % (ref 11.6–15.6)
EOSINOPHIL # BLD: 0.7 % (ref 0–4.5)
GLUCOSE SERPL-MCNC: 80 MG/DL (ref 74–106)
HCT VFR BLD CALC: 34.7 % (ref 32.4–45.2)
HGB BLD-MCNC: 11.7 GM/DL (ref 10.7–15.3)
LYMPHOCYTES # BLD: 24 % (ref 8–40)
MAGNESIUM SERPL-MCNC: 1.9 MG/DL (ref 1.8–2.4)
MCH RBC QN AUTO: 29.2 PG (ref 25.7–33.7)
MCHC RBC AUTO-ENTMCNC: 33.7 G/DL (ref 32–36)
MCV RBC: 86.6 FL (ref 80–96)
MONOCYTES # BLD AUTO: 5.4 % (ref 3.8–10.2)
NEUTROPHILS # BLD: 69.5 % (ref 42.8–82.8)
PLATELET # BLD AUTO: 229 K/MM3 (ref 134–434)
PMV BLD: 8.6 FL (ref 7.5–11.1)
POTASSIUM SERPLBLD-SCNC: 3.1 MMOL/L (ref 3.5–5.1)
PROT SERPL-MCNC: 6.5 G/DL (ref 6.4–8.2)
RBC # BLD AUTO: 4.01 M/MM3 (ref 3.6–5.2)
SODIUM SERPL-SCNC: 139 MMOL/L (ref 136–145)
WBC # BLD AUTO: 5.4 K/MM3 (ref 4–10)

## 2020-02-06 RX ADMIN — POTASSIUM CHLORIDE SCH MLS/HR: 7.46 INJECTION, SOLUTION INTRAVENOUS at 11:39

## 2020-02-06 RX ADMIN — PANTOPRAZOLE SODIUM SCH MG: 40 INJECTION, POWDER, FOR SOLUTION INTRAVENOUS at 10:00

## 2020-02-06 RX ADMIN — SODIUM CHLORIDE, POTASSIUM CHLORIDE, SODIUM LACTATE AND CALCIUM CHLORIDE SCH MLS/HR: 600; 310; 30; 20 INJECTION, SOLUTION INTRAVENOUS at 06:24

## 2020-02-06 RX ADMIN — ONDANSETRON SCH MG: 2 INJECTION INTRAMUSCULAR; INTRAVENOUS at 06:15

## 2020-02-06 RX ADMIN — CEFTRIAXONE SCH MLS/HR: 1 INJECTION, POWDER, FOR SOLUTION INTRAMUSCULAR; INTRAVENOUS at 10:00

## 2020-02-06 RX ADMIN — ACETAMINOPHEN ONE MG: 10 INJECTION, SOLUTION INTRAVENOUS at 06:59

## 2020-02-06 RX ADMIN — ENOXAPARIN SODIUM SCH: 40 INJECTION SUBCUTANEOUS at 10:00

## 2020-02-06 RX ADMIN — POTASSIUM CHLORIDE ONE: 1500 TABLET, EXTENDED RELEASE ORAL at 10:16

## 2020-02-06 RX ADMIN — POTASSIUM CHLORIDE SCH: 7.46 INJECTION, SOLUTION INTRAVENOUS at 13:25

## 2020-02-06 RX ADMIN — METOCLOPRAMIDE SCH MG: 5 INJECTION, SOLUTION INTRAMUSCULAR; INTRAVENOUS at 01:53

## 2020-02-06 RX ADMIN — POTASSIUM CHLORIDE ONE MEQ: 1500 TABLET, EXTENDED RELEASE ORAL at 10:00

## 2020-02-06 RX ADMIN — KETOROLAC TROMETHAMINE PRN MG: 30 INJECTION INTRAMUSCULAR; INTRAVENOUS at 13:28

## 2020-02-06 RX ADMIN — ONDANSETRON SCH MG: 2 INJECTION INTRAMUSCULAR; INTRAVENOUS at 01:53

## 2020-02-06 RX ADMIN — METOCLOPRAMIDE SCH: 5 INJECTION, SOLUTION INTRAMUSCULAR; INTRAVENOUS at 17:01

## 2020-02-06 RX ADMIN — METOCLOPRAMIDE SCH MG: 5 INJECTION, SOLUTION INTRAMUSCULAR; INTRAVENOUS at 11:39

## 2020-02-06 RX ADMIN — KETOROLAC TROMETHAMINE PRN MG: 30 INJECTION INTRAMUSCULAR; INTRAVENOUS at 08:24

## 2020-02-06 NOTE — DS
Physical Exam: 


SUBJECTIVE: Patient seen and examined


Patient seen and examined at the bedside.  she is having some abdominal pain 

this morning after clears.  No nausea or vomiting.  very anxious and asking to 

go home.  She gave me permission to call her mother Clay  and 

update her on plan of care, status.   





OBJECTIVE:


Patient is a 19 year old female with a past medical history of migraines, 

anxiety, pneumomediastinum from vomiting, and chronic marijuana use. She 

presents for a 1 day history of epigastric pain, 3-4 episodes of nausea and 

green vomiting and 1-2 episodes of diarrhea without blood.   An abdomen/pelvis 

CT shows diffusely thickened colon suspicious for colitis.  A left ovarian cyst 

also noted.





per GI, advance diet and if patient tolerates diet, can discharge home later 

today.


 


 Vital Signs











 Period  Temp  Pulse  Resp  BP Sys/Hutson  Pulse Ox


 


 Last 24 Hr  97.5 F-98.9 F  62-76  17-18  115-126/73-78  99








PHYSICAL EXAM


 GENERAL: The patient is awake, alert, and fully oriented, in no acute distress.


HEAD: Normal with no signs of trauma.


EYES: PERRL, extraocular movements intact, sclera anicteric, conjunctiva clear. 

No ptosis. 


ENT: Ears normal, nares patent 


NECK: Trachea midline, full range of motion, supple. 


LUNGS: Breath sounds equal, clear to auscultation bilaterally 


HEART: Regular rate and rhythm 


ABDOMEN: Soft, nontender, nondistended, normoactive bowel sounds 


EXTREMITIES: 2+ pulses, warm, well-perfused, no edema. 


NEUROLOGICAL: awake and alert


PSYCH: Normal mood, normal affect.


SKIN: Warm, dry, normal turgor, no rashes or lesions noted





LABS


 Laboratory Results - last 24 hr











  02/06/20 02/06/20





  06:40 06:40


 


WBC  5.4 


 


RBC  4.01 


 


Hgb  11.7 


 


Hct  34.7 


 


MCV  86.6 


 


MCH  29.2 


 


MCHC  33.7 


 


RDW  12.4 


 


Plt Count  229 


 


MPV  8.6 


 


Absolute Neuts (auto)  3.8 


 


Neutrophils %  69.5 


 


Lymphocytes %  24.0 


 


Monocytes %  5.4 


 


Eosinophils %  0.7  D 


 


Basophils %  0.4 


 


Nucleated RBC %  0 


 


Sodium   139


 


Potassium   3.1 L


 


Chloride   107


 


Carbon Dioxide   23


 


Anion Gap   10


 


BUN   6.2 L


 


Creatinine   0.8


 


Est GFR (CKD-EPI)AfAm   122.14


 


Est GFR (CKD-EPI)NonAf   105.39


 


Random Glucose   80


 


Calcium   8.4 L


 


Magnesium   1.9


 


Total Bilirubin   0.8


 


AST   13 L


 


ALT   17


 


Alkaline Phosphatase   60


 


Total Protein   6.5


 


Albumin   3.6











HOSPITAL COURSE:





Date of Admission:02/04/20





Date of Discharge: 02/06/20











Minutes to complete discharge: 45





Discharge Summary


Problems reviewed: Yes


Reason For Visit: COLITIS INTRACTABLE VOMITING


Current Active Problems





Anxiety (Acute)


Colitis (Acute)


DVT (deep venous thrombosis) (Acute)


DVT prophylaxis (Acute)


Hyperemesis (Acute)


Intractable vomiting (Acute)


Prophylactic measure (Acute)








Condition: Improved





- Instructions


Diet, Activity, Other Instructions: 


Ms Azevedo:





You were admitted to [facility] for colitis.  


What is colitis?


Colitis is an inflammation of our colon that can occur spontaneously.  The 

treatment is bowel rest and antibiotics.  





Here are our discharge recommendations:


Continue these medication for treatment of colitis as follows:


Ceftin 500mg TWICE per day at 8am and 8pm for 7 more days (2/7/2020 - 2/13/2020

) - antibiotic for colitis


Flagyl 250mg THREE times per day at 8am, 2pm and 8pm for 7 more days -(2/7/2020 

- 2/13/2020) - antibiotic for colitis


Reglan 5mg three times per day BEFORE meals - anti nause medication before 

meals.


Protonix 40mg DAILY one hour before breakfast - will help maintain your stomach 

alexey


Toradol 10mg AS NEEDED every 6 hours for abdominal pain -as needed for pain





Diet:


Continue a low fiber/low residue diet without any lactose.  





**Please note that it is important that you follow up with Dr. Perry by 

calling his office for an appointment..





Thank you for allowing us to care for you. 

















Referrals: 


David Perry MD [Staff Physician] - 


Disposition: HOME





- Home Medications


Comprehensive Discharge Medication List: 


Ambulatory Orders





Cefuroxime Axetil [Ceftin -] 500 mg PO Q12H #14 tablet 02/06/20 


Ketorolac Tromethamine [Toradol] 10 mg PO TID #10 tablet 02/06/20 


Metoclopramide HCl [Reglan -] 5 mg PO TIDAC #21 tablet 02/06/20 


Pantoprazole Sodium [Protonix -] 40 mg PO DAILY #30 tablet.ec 02/06/20 


metroNIDAZOLE [Flagyl -] 250 mg PO TID #21 tablet 02/06/20 











Problem List





- Problems


(1) Colitis


Assessment/Plan: 


ct scan as noted above.  


on flagyl and ceftriaxone per GI, can discharge home per GI recommendations as 

patient has tolerated two regular diet without any vomiting or nausea.  She 

agrees to follow up with GI as an outpatient.   She agrees to continue the 

antibiotics.


Code(s): K52.9 - NONINFECTIVE GASTROENTERITIS AND COLITIS, UNSPECIFIED   





(2) Abdominal pain


Assessment/Plan: 


mild pain this a.m. without nausea or vomiting which improved in the afternoon.


Code(s): R10.9 - UNSPECIFIED ABDOMINAL PAIN   





(3) Anxiety


Assessment/Plan: 


trial of ativan 0.5mg PO BID


Code(s): F41.9 - ANXIETY DISORDER, UNSPECIFIED   





(4) Prophylactic measure


Assessment/Plan: 


d/c home





Code(s): Z29.9 - ENCOUNTER FOR PROPHYLACTIC MEASURES, UNSPECIFIED   





(5) DVT prophylaxis


Assessment/Plan: 


lovenox 40/ambulation





Code(s): Z29.9 - ENCOUNTER FOR PROPHYLACTIC MEASURES, UNSPECIFIED   


This patient is new to me today: No


Emergency Visit: Yes


ED Registration Date: 02/04/20


Care time: The patient presented to the Emergency Department on the above date 

and was hospitalized for further evaluation of their emergent condition.


Critical Care patient: No





- Discharge Referral


Referred to [facility] No

## 2020-02-06 NOTE — PN
Physical Exam: 


SUBJECTIVE: 





Patient seen and examined at the bedside.  she is having some abdominal pain 

this morning after clears.  No nausea or vomiting.  very anxious and asking to 

go home.  She gave me permission to call her mother Clay  and 

update her on plan of care, status.   





OBJECTIVE:


Patient is a 19 year old female with a past medical history of migraines, 

anxiety, pneumomediastinum from vomiting, and chronic marijuana use. She 

presents for a 1 day history of epigastric pain, 3-4 episodes of nausea and 

green vomiting and 1-2 episodes of diarrhea without blood.   An abdomen/pelvis 

CT shows diffusely thickened colon suspicious for colitis.  A left ovarian cyst 

also noted.





 Vital Signs











 Period  Temp  Pulse  Resp  BP Sys/Hutson  Pulse Ox


 


 Last 24 Hr  97.5 F-98.9 F  62-83  17-18  109-126/68-78  99








GENERAL: The patient is awake, alert, and fully oriented, in no acute distress.


HEAD: Normal with no signs of trauma.


EYES: PERRL, extraocular movements intact, sclera anicteric, conjunctiva clear. 

No ptosis. 


ENT: Ears normal, nares patent 


NECK: Trachea midline, full range of motion, supple. 


LUNGS: Breath sounds equal, clear to auscultation bilaterally 


HEART: Regular rate and rhythm 


ABDOMEN: Soft, nontender, nondistended, normoactive bowel sounds 


EXTREMITIES: 2+ pulses, warm, well-perfused, no edema. 


NEUROLOGICAL: awake and alert


PSYCH: Normal mood, normal affect.


SKIN: Warm, dry, normal turgor, no rashes or lesions noted


 Laboratory Results - last 24 hr











  02/06/20 02/06/20





  06:40 06:40


 


WBC  5.4 


 


RBC  4.01 


 


Hgb  11.7 


 


Hct  34.7 


 


MCV  86.6 


 


MCH  29.2 


 


MCHC  33.7 


 


RDW  12.4 


 


Plt Count  229 


 


MPV  8.6 


 


Absolute Neuts (auto)  3.8 


 


Neutrophils %  69.5 


 


Lymphocytes %  24.0 


 


Monocytes %  5.4 


 


Eosinophils %  0.7  D 


 


Basophils %  0.4 


 


Nucleated RBC %  0 


 


Sodium   139


 


Potassium   3.1 L


 


Chloride   107


 


Carbon Dioxide   23


 


Anion Gap   10


 


BUN   6.2 L


 


Creatinine   0.8


 


Est GFR (CKD-EPI)AfAm   122.14


 


Est GFR (CKD-EPI)NonAf   105.39


 


Random Glucose   80


 


Calcium   8.4 L


 


Magnesium   1.9


 


Total Bilirubin   0.8


 


AST   13 L


 


ALT   17


 


Alkaline Phosphatase   60


 


Total Protein   6.5


 


Albumin   3.6








Active Medications











Generic Name Dose Route Start Last Admin





  Trade Name Gracie  PRN Reason Stop Dose Admin


 


Acetaminophen  1,000 mg  02/04/20 14:34  02/06/20 06:59





  Ofirmev Injection -  IVPB   1,000 mg





  ONCE ONE   Administration





     





     





     





     


 


Enoxaparin Sodium  40 mg  02/05/20 10:00  02/06/20 10:00





  Lovenox -  SQ   Not Given





  DAILY SENIA   





     





     





     





     


 


Lactated Ringer's  1,000 ml in 1,000 mls @ 100 mls/hr  02/04/20 18:15  02/06/20 

06:24





  Lactated Ringers Solution  IV   100 mls/hr





  ASDIR SENIA   Administration





     





     





     





     


 


Ceftriaxone Sodium 1 gm/  50 mls @ 200 mls/hr  02/05/20 10:00  02/06/20 10:00





  Dextrose  IVPB   200 mls/hr





  DAILY SENIA   Administration





     





     





  Protocol   





     


 


Metronidazole  250 mg in 50 mls @ 50 mls/hr  02/05/20 10:00  02/06/20 10:30





  Flagyl 250mg Premixed Ivpb -  IVPB   50 mls/hr





  Q8H-IV SENIA   Administration





     





     





     





     


 


Potassium Chloride  10 meq in 100 mls @ 100 mls/hr  02/06/20 10:45  





  Potassium Chloride 10 Meq Premix Ivpb -  IVPB  02/06/20 12:44  





  Q60M SENIA   





     





     





     





     


 


Ketorolac Tromethamine  30 mg  02/06/20 07:55  02/06/20 08:24





  Toradol Injection -  IVPUSH  02/11/20 07:54  30 mg





  Q6H PRN   Administration





  PAIN LEVEL 7 - 10   





     





     





     


 


Lorazepam  0.5 mg  02/06/20 11:00  





  Ativan -  PO   





  BID PRN   





  ANXIETY   





     





     





     


 


Metoclopramide HCl  5 mg  02/05/20 18:45  02/06/20 01:53





  Reglan Injection -  IVPB   5 mg





  Q8H-IV SENIA   Administration





     





     





     





     


 


Ondansetron HCl  4 mg  02/06/20 06:00  





  Zofran Injection  IVPB   





  Q4H PRN   





  NAUSEA AND/OR VOMITING   





     





     





     


 


Pantoprazole Sodium  40 mg  02/05/20 22:00  02/06/20 10:00





  Protonix Iv  IVPUSH   40 mg





  BID SENIA   Administration





     





     





     





     











ASSESSMENT/PLAN:








Problem List





- Problems


(1) Colitis


Assessment/Plan: 


ct scan as noted above.  


on flagyl and ceftriaxone per GI


stool studies ordered/pending


 


Code(s): K52.9 - NONINFECTIVE GASTROENTERITIS AND COLITIS, UNSPECIFIED   





(2) Abdominal pain


Assessment/Plan: 


mild pain this a.m. without nausea or vomiting.  


advance diet per GI


Code(s): R10.9 - UNSPECIFIED ABDOMINAL PAIN   





(3) Anxiety


Assessment/Plan: 


trial of ativan 0.5mg PO BID


Code(s): F41.9 - ANXIETY DISORDER, UNSPECIFIED   





(4) Prophylactic measure


Assessment/Plan: 


fen


lovenox


continue ivf


monitor electrolytes


full code





Code(s): Z29.9 - ENCOUNTER FOR PROPHYLACTIC MEASURES, UNSPECIFIED   





(5) DVT prophylaxis


Assessment/Plan: 


lovenox 40/ambulation





Code(s): Z29.9 - ENCOUNTER FOR PROPHYLACTIC MEASURES, UNSPECIFIED   





Visit type





- Emergency Visit


Emergency Visit: Yes


ED Registration Date: 02/04/20


Care time: The patient presented to the Emergency Department on the above date 

and was hospitalized for further evaluation of their emergent condition.





- New Patient


This patient is new to me today: No





- Critical Care


Critical Care patient: No





- Discharge Referral


Referred to [facility] No